# Patient Record
Sex: FEMALE | Race: WHITE | NOT HISPANIC OR LATINO | Employment: OTHER | URBAN - METROPOLITAN AREA
[De-identification: names, ages, dates, MRNs, and addresses within clinical notes are randomized per-mention and may not be internally consistent; named-entity substitution may affect disease eponyms.]

---

## 2020-08-06 PROBLEM — E11.8 CONTROLLED TYPE 2 DIABETES MELLITUS WITH COMPLICATION, WITHOUT LONG-TERM CURRENT USE OF INSULIN: Status: ACTIVE | Noted: 2018-12-17

## 2020-08-07 PROBLEM — M70.60 TROCHANTERIC BURSITIS: Status: ACTIVE | Noted: 2017-10-20

## 2020-08-07 PROBLEM — K21.9 GASTROESOPHAGEAL REFLUX DISEASE WITH HIATAL HERNIA: Status: ACTIVE | Noted: 2020-08-07

## 2020-08-07 PROBLEM — R91.1 LUNG NODULE: Status: RESOLVED | Noted: 2020-08-07 | Resolved: 2020-08-07

## 2020-08-07 PROBLEM — R91.1 LUNG NODULE: Status: ACTIVE | Noted: 2020-08-07

## 2020-08-07 PROBLEM — K44.9 GASTROESOPHAGEAL REFLUX DISEASE WITH HIATAL HERNIA: Status: ACTIVE | Noted: 2020-08-07

## 2022-06-15 ENCOUNTER — OUTPATIENT CASE MANAGEMENT (OUTPATIENT)
Dept: ADMINISTRATIVE | Facility: OTHER | Age: 82
End: 2022-06-15

## 2022-06-15 NOTE — PROGRESS NOTES
SW received  A referral on the above patient. Per chart review patient assessed by Lashell Horton LPN on 06/03/2022.

## 2022-11-09 PROBLEM — R23.2 HOT FLASHES: Status: ACTIVE | Noted: 2022-11-09

## 2022-11-09 PROBLEM — I50.22 CHRONIC SYSTOLIC CONGESTIVE HEART FAILURE: Status: ACTIVE | Noted: 2022-01-01

## 2023-01-23 ENCOUNTER — EXTERNAL HOSPITAL ADMISSION (OUTPATIENT)
Dept: ADMINISTRATIVE | Facility: CLINIC | Age: 83
End: 2023-01-23

## 2023-01-23 ENCOUNTER — PATIENT OUTREACH (OUTPATIENT)
Dept: ADMINISTRATIVE | Facility: CLINIC | Age: 83
End: 2023-01-23

## 2023-01-23 NOTE — PROGRESS NOTES
C3 nurse attempted to contact Brianne Courtney for a TCC post hospital discharge follow up call. No answer. Left voicemail with callback information. The patient does not have a scheduled HOSFU appointment. Message sent to PCP staff for assistance with scheduling visit with patient.

## 2024-03-22 PROBLEM — I70.0 AORTIC ATHEROSCLEROSIS: Status: ACTIVE | Noted: 2024-03-22

## 2024-03-30 PROBLEM — R94.2 ABNORMAL PFTS (PULMONARY FUNCTION TESTS): Status: ACTIVE | Noted: 2024-03-30

## 2024-06-24 ENCOUNTER — PATIENT OUTREACH (OUTPATIENT)
Dept: ADMINISTRATIVE | Facility: OTHER | Age: 84
End: 2024-06-24

## 2024-06-24 NOTE — PROGRESS NOTES
CHW - Initial Contact    This Community Health Worker completed the Social Determinant of Health questionnaire with patient during clinic visit today.    Pt identified barriers of most importance are: patient identified no barriers of importance during clinic visit    Referrals to community agencies completed with patient consent outside of [facility] include: No community referral is needed   During clinic visit   Referrals were put through [facility] - no:   Support and Services: No support services needed during clinic visit   Other information discussed the patient needs help with: patient discussed on other information during clinic visit    Follow up required: yes  Follow-up Outreach - Due: 7/15/2024

## 2024-09-17 ENCOUNTER — PATIENT OUTREACH (OUTPATIENT)
Dept: ADMINISTRATIVE | Facility: OTHER | Age: 84
End: 2024-09-17

## 2024-09-17 NOTE — PROGRESS NOTES
CHW - Follow Up    This Community Health Worker completed a follow up visit with patient via telephone today.  Pt reported: patient reported she is doing okay no assistance is needed during follow up phone interview.   Patient has some concerns regarding  urinalysis . Message was sent to provider. Patient will reach out if   Any assistance is needed in the future.   Community Health Worker provided: CHW spoke with patient she is doing okay  Follow up required: No  No future outreach task assigned                     CHW - Case Closure    This Community Health Worker spoke to patient via telephone today.   Pt reported: patient reported she is doing okay no assistance is needed during follow up phone interview.   Patient has some concerns regarding  urinalysis . Message was sent to provider. Patient will reach out if   Any assistance is needed in the future.   Pt denied any additional needs at this time and agrees with episode closure at this time.    Provided patient with Community Health Worker's contact information and encouraged   her to contact this Community Health Worker if additional needs arise.

## 2024-09-18 ENCOUNTER — OUTPATIENT CASE MANAGEMENT (OUTPATIENT)
Dept: ADMINISTRATIVE | Facility: OTHER | Age: 84
End: 2024-09-18

## 2024-09-27 ENCOUNTER — OUTPATIENT CASE MANAGEMENT (OUTPATIENT)
Dept: ADMINISTRATIVE | Facility: OTHER | Age: 84
End: 2024-09-27

## 2024-09-27 NOTE — LETTER
September 27, 2024             Dear Brianne,    Welcome to [facility] Complex Care Management [facility].  It was a pleasure talking with you today.  My name is Martha VELASQUEZ UptonAlex, and I look forward to being your Care Manager.  My goal is to help you function at the healthiest and highest level possible.  You can contact me directly at [phone].    As an [facility] patient, some of the services we may be able to provide include:     Development of an individualized care plan with a Registered Nurse   Connection with a   Connection with available resources and services    Coordinate communication among your care team members   Provide coaching and education   Help you understand your doctors treatment plan  Help you obtain information about your insurance coverage.     All services provided by [facility] Complex Care Managers and other care team members are coordinated with and communicated to your primary care team.      As part of your enrollment, you will be receiving education materials and more information about these services in your [facility] account, by phone or through the mail.  If you do not wish to participate or receive information, please contact our office at [phone].      Sincerely,        Martha Alex, RN  [facility]   Out-patient RN Complex Care Manager

## 2024-09-30 NOTE — PROGRESS NOTES
Outpatient Care Management  Initial Patient Assessment    Patient: Brianne Courtney  MRN: [id number]  Date of Service: 09/27/2024  Completed by: Martha Alex RN  Referral Date: 09/17/2024  Date of Eligibility: 9/18/2024  Program:   High Risk  Status: Ongoing  Effective Dates: 9/27/2024 - present  Responsible Staff: Martha Alex, RN        Reason for Visit   Patient presents with    [facility] Enrollment Call       Brief Summary:  Brianne Courtney was referred by Dr TIKI Garcia for aortic atherosclerosis, hypothyroidism and debility. Patient qualifies for program based on risk score of > 60% at time of referral.   Active problem list, medical, surgical and social history reviewed. 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 Areas of need identified by patient include - she agrees to have educational literature mailed about diabetes and atherosclerosis. She also requests that this CM message her PCP to ask for refill authorizations for ASA, atorvastatin, cetirizine, furosemide, levothyroxine and sertraline.  Next steps: Send educational literature about atherosclerosis and diabetes and follow up to discuss both disease processes and how to manage them. Message PCP to ask for refill authorizations on the above meds. Martha Alex RN     Disability Status  Is the patient alert and oriented (person, place, time, and situation)?: Alert and oriented x 4  Hearing Difficulty or Deaf: yes  Hearing Management: -- (Has debby hearing defiicts.)  Visual Difficulty or Blind: yes  Visual and Hearing Needs Conclusion: -- (Macular degeneration, considered legally blind)  Vision Management: -- (Sees eye )  Difficulty Concentrating, Remembering or Making Decisions: no  Communication Difficulty: yes  Communication: difficulty understanding  Eating/Swallowing Difficulty: no  Walking or Climbing Stairs Difficulty: yes  Walking or Climbing  Stairs: ambulation difficulty, requires equipment; stair climbing difficulty, requires equipment  Mobility Management: -- (Uses a rollator for most mobility.)  Dressing/Bathing Difficulty: no  Toileting : Independent  Continence : Continence - Not a problem  Difficulty Managing Errands Independently: yes  Errands Management: -- (Son buys groceries.)  Equipment Currently Used at Home: rollator; oxygen; glucometer; blood pressure machine  ADL Conclusion Statement: Is independent wtih ADL's and most IADL's. Son assists with some IADL's like transportation and shopping.  Change in Functional Status Since Onset of Current Illness/Injury: no        Spiritual Beliefs  Spiritual, Cultural Beliefs, Mu-ism Practices, Values that Affect Care: no      Social History     Socioeconomic History    Marital status:    Occupational History    Occupation: retired   Tobacco Use    Smoking status: Never    Smokeless tobacco: Never   Substance and Sexual Activity    Alcohol use: Not Currently    Drug use: Never    Sexual activity: Not Currently     Social Drivers of Health     Financial Resource Strain: Low Risk  (6/24/2024)    Overall Financial Resource Strain (CARDIA)     Difficulty of Paying Living Expenses: Not hard at all   Food Insecurity: No Food Insecurity (6/24/2024)    Hunger Vital Sign     Worried About Running Out of Food in the Last Year: Never true     Ran Out of Food in the Last Year: Never true   Transportation Needs: No Transportation Needs (6/24/2024)    TRANSPORTATION NEEDS     Transportation : No   Physical Activity: Inactive (6/24/2024)    Exercise Vital Sign     Days of Exercise per Week: 0 days     Minutes of Exercise per Session: 0 min   Stress: No Stress Concern Present (6/24/2024)    [employer] [place] [employer] - Occupational Stress Questionnaire     Feeling of Stress : Not at all   Housing Stability: Low Risk  (6/24/2024)    Housing Stability Vital Sign     Unable to Pay for Housing in  the Last Year: No     Homeless in the Last Year: No       Roles and Relationships  Primary Source of Support/Comfort: child(grecia)  Name of Support/Comfort Primary Source: -- (Zahida)  Secondary Source of Support/Comfort: no one      Advance Directives (For Healthcare)  Advance Directive  (If Adv Dir status is received, view document under Adv Dir in header or Chart Review Media tab): Patient does not have Advance Directive, declines information.        Patient Reported Insurance  Verified current insurance plan:: Medicare            9/27/2024     3:56 PM 9/16/2024     3:26 PM 6/24/2024     1:39 PM 3/22/2024     1:15 PM 3/15/2024     1:29 PM 12/15/2023    12:40 PM 9/22/2023     1:42 PM   Depression Patient Health Questionnaire   Over the last two weeks how often have you been bothered by little interest or pleasure in doing things Not at all Not at all Not at all Not at all Not at all Not at all Not at all   Over the last two weeks how often have you been bothered by feeling down, depressed or hopeless Several days Not at all Not at all Not at all Not at all Not at all Not at all   PHQ-2 Total Score 1 0 0 0 0 0 0       Learning Assessment       09/27/2024 1628 [facility] (9/27/2024 - Present)   Created by Martha Alex, RN - RN (Nurse) Status: Complete                 PRIMARY LEARNER     Primary Learner Name:  Brianne  - 09/27/2024 1628    Relationship:  Patient  - 09/27/2024 1628    Does the primary learner have any barriers to learning?:  No Barriers  - 09/27/2024 1628    What is the preferred language of the primary learner?:  English  - 09/27/2024 1628    Is an  required?:  No  - 09/27/2024 1628    How does the primary learner prefer to learn new concepts?:  Listening, Reading  - 09/27/2024 1628    How often do you need to have someone help you read instructions, pamphlets, or written material from your doctor or pharmacy?:  Rarely  - 09/27/2024 1628        CO-LEARNER #1      No question answered        CO-LEARNER #2     No question answered        SPECIAL TOPICS     No question answered        ANSWERED BY:     No question answered        Comments         Edit Martha Kraus, RN - RN (Nurse)   09/27/2024 [id number]

## 2024-10-03 ENCOUNTER — PATIENT OUTREACH (OUTPATIENT)
Dept: ADMINISTRATIVE | Facility: OTHER | Age: 84
End: 2024-10-03

## 2024-10-04 ENCOUNTER — OUTPATIENT CASE MANAGEMENT (OUTPATIENT)
Dept: ADMINISTRATIVE | Facility: OTHER | Age: 84
End: 2024-10-04

## 2024-10-10 ENCOUNTER — OUTPATIENT CASE MANAGEMENT (OUTPATIENT)
Dept: ADMINISTRATIVE | Facility: OTHER | Age: 84
End: 2024-10-10

## 2024-10-10 NOTE — PROGRESS NOTES
Outpatient Care Management  Plan of Care Follow Up Visit    Patient: Brianne Courtney  MRN: [id number]  Date of Service: 10/10/2024  Completed by: Martha Alex RN  Referral Date: 09/17/2024    Reason for Visit   Patient presents with    [facility] RN Follow Up Call       Brief Summary: Reviewed upcoming new pt pulm provider appt on 10/23/24 at 2:40 PM. She voiced understanding of time and date of appt with plans to attend.   Next Steps: Will follow up after new pt pulm appt to review outcome and answer any questions she may have. Martha Alex RN

## 2024-10-24 ENCOUNTER — OUTPATIENT CASE MANAGEMENT (OUTPATIENT)
Dept: ADMINISTRATIVE | Facility: OTHER | Age: 84
End: 2024-10-24

## 2024-10-31 ENCOUNTER — OUTPATIENT CASE MANAGEMENT (OUTPATIENT)
Dept: ADMINISTRATIVE | Facility: OTHER | Age: 84
End: 2024-10-31

## 2024-11-14 ENCOUNTER — OUTPATIENT CASE MANAGEMENT (OUTPATIENT)
Dept: ADMINISTRATIVE | Facility: OTHER | Age: 84
End: 2024-11-14

## 2024-11-27 ENCOUNTER — OUTPATIENT CASE MANAGEMENT (OUTPATIENT)
Dept: ADMINISTRATIVE | Facility: OTHER | Age: 84
End: 2024-11-27

## 2024-11-27 NOTE — PROGRESS NOTES
Outpatient Care Management  Plan of Care Follow Up Visit    Patient: Brianne Courtney  MRN: [id number]  Date of Service: 11/27/2024  Completed by: Martha Alex RN  Referral Date: 09/17/2024    Reason for Visit   Patient presents with    [facility] RN Follow Up Call    Case Closure     3rd Attempt.        Brief Summary: Unable to reach after 3 phone contact attempts and no response rec'd from letter mailed on 11/14/24 requesting return call.   Next Steps: Will d/c from [facility] today. Martha Alex RN

## 2024-12-18 ENCOUNTER — PATIENT OUTREACH (OUTPATIENT)
Dept: ADMINISTRATIVE | Facility: CLINIC | Age: 84
End: 2024-12-18

## 2025-07-11 ENCOUNTER — OUTPATIENT CASE MANAGEMENT (OUTPATIENT)
Dept: ADMINISTRATIVE | Facility: OTHER | Age: 85
End: 2025-07-11